# Patient Record
Sex: MALE | Race: WHITE | NOT HISPANIC OR LATINO | URBAN - METROPOLITAN AREA
[De-identification: names, ages, dates, MRNs, and addresses within clinical notes are randomized per-mention and may not be internally consistent; named-entity substitution may affect disease eponyms.]

---

## 2017-02-04 ENCOUNTER — OFFICE VISIT (OUTPATIENT)
Dept: RETAIL CLINIC | Facility: CLINIC | Age: 22
End: 2017-02-04

## 2017-02-04 VITALS
DIASTOLIC BLOOD PRESSURE: 80 MMHG | SYSTOLIC BLOOD PRESSURE: 120 MMHG | RESPIRATION RATE: 18 BRPM | OXYGEN SATURATION: 98 % | HEART RATE: 108 BPM | TEMPERATURE: 98 F

## 2017-02-04 DIAGNOSIS — H65.191 OTHER ACUTE NONSUPPURATIVE OTITIS MEDIA OF RIGHT EAR, RECURRENCE NOT SPECIFIED: Primary | ICD-10-CM

## 2017-02-04 PROCEDURE — 99203 OFFICE O/P NEW LOW 30 MIN: CPT | Performed by: NURSE PRACTITIONER

## 2017-02-04 RX ORDER — AZITHROMYCIN 250 MG/1
TABLET, FILM COATED ORAL
Qty: 6 TABLET | Refills: 0 | Status: SHIPPED | OUTPATIENT
Start: 2017-02-04 | End: 2021-12-06

## 2017-02-04 NOTE — PROGRESS NOTES
Subjective   Sarkis Wick is a 21 y.o. male.     HPI Comments: Patient reports PCN allergy and also doesn't think he is able to use cephalosporins either.    Earache    There is pain in the right ear. This is a new problem. Episode onset: 2 days ago. The problem occurs constantly. The problem has been rapidly worsening. The maximum temperature recorded prior to his arrival was 103 - 104 F (tmax 103.4 2 days ago, afebrile last 36 hours). The fever has been present for less than 1 day. Associated symptoms include hearing loss (right only, decreased) and a sore throat. Pertinent negatives include no abdominal pain, coughing, diarrhea, ear discharge, headaches, neck pain, rash, rhinorrhea or vomiting. Treatments tried: mucinex. The treatment provided no relief. There is no history of a chronic ear infection, hearing loss or a tympanostomy tube.       The following portions of the patient's history were reviewed and updated as appropriate: allergies, current medications, past family history, past medical history, past social history, past surgical history and problem list.    Review of Systems   Constitutional: Negative for appetite change, chills, diaphoresis, fatigue and fever.   HENT: Positive for ear pain (right only), hearing loss (right only, decreased), sinus pressure and sore throat. Negative for congestion, dental problem, ear discharge, facial swelling, mouth sores, nosebleeds, postnasal drip, rhinorrhea, sneezing, tinnitus, trouble swallowing and voice change.    Eyes: Negative for pain, discharge, redness and itching.   Respiratory: Negative for cough, chest tightness, shortness of breath, wheezing and stridor.    Cardiovascular: Negative for chest pain and palpitations.   Gastrointestinal: Negative for abdominal pain, constipation, diarrhea, nausea and vomiting.   Genitourinary: Negative for decreased urine volume.   Musculoskeletal: Negative for myalgias, neck pain and neck stiffness.   Skin: Negative for  rash.   Allergic/Immunologic: Negative for environmental allergies.   Neurological: Negative for dizziness, syncope, weakness and headaches.       Objective   Physical Exam   Constitutional: He is oriented to person, place, and time. He appears well-developed and well-nourished. He is cooperative.  Non-toxic appearance. He does not appear ill. No distress.   HENT:   Right Ear: External ear normal. There is swelling (canal) and tenderness. No drainage. No mastoid tenderness. Tympanic membrane is erythematous (severe) and retracted. Tympanic membrane is not scarred, not perforated and not bulging. Decreased hearing is noted.   Left Ear: Hearing, external ear and ear canal normal. Tympanic membrane is not scarred, not perforated, not erythematous, not retracted and not bulging. A middle ear effusion is present. No decreased hearing is noted.   Nose: Nose normal. Right sinus exhibits no maxillary sinus tenderness and no frontal sinus tenderness. Left sinus exhibits no maxillary sinus tenderness and no frontal sinus tenderness.   Mouth/Throat: Uvula is midline and mucous membranes are normal. Posterior oropharyngeal erythema present. No oropharyngeal exudate or posterior oropharyngeal edema. Tonsils are 2+ on the right. Tonsils are 2+ on the left. No tonsillar exudate.   Eyes: Conjunctivae and lids are normal.   Cardiovascular: Normal rate, regular rhythm, S1 normal and S2 normal.    Pulmonary/Chest: Effort normal and breath sounds normal.   Abdominal: Soft. Normal appearance and bowel sounds are normal. There is no tenderness.   Lymphadenopathy:     He has no cervical adenopathy.   Neurological: He is alert and oriented to person, place, and time.   Skin: Skin is warm and dry. He is not diaphoretic.   Vitals reviewed.      Assessment/Plan   Sarkis was seen today for earache.    Diagnoses and all orders for this visit:    Other acute nonsuppurative otitis media of right ear, recurrence not specified  -     azithromycin  (ZITHROMAX Z-KRISTA) 250 MG tablet; Take 2 tablets the first day, then 1 tablet daily for 4 days.     Establish care with PCP.  Follow up with PCP for persistent or worsening symptoms.

## 2017-02-04 NOTE — PATIENT INSTRUCTIONS

## 2021-12-06 ENCOUNTER — HOSPITAL ENCOUNTER (EMERGENCY)
Facility: HOSPITAL | Age: 26
Discharge: HOME | End: 2021-12-06
Attending: EMERGENCY MEDICINE | Admitting: EMERGENCY MEDICINE

## 2021-12-06 VITALS
BODY MASS INDEX: 28.12 KG/M2 | HEIGHT: 66 IN | RESPIRATION RATE: 18 BRPM | SYSTOLIC BLOOD PRESSURE: 144 MMHG | HEART RATE: 118 BPM | TEMPERATURE: 98.3 F | WEIGHT: 175 LBS | DIASTOLIC BLOOD PRESSURE: 84 MMHG | OXYGEN SATURATION: 98 %

## 2021-12-06 DIAGNOSIS — S61.210A LACERATION OF RIGHT INDEX FINGER WITHOUT FOREIGN BODY WITHOUT DAMAGE TO NAIL, INITIAL ENCOUNTER: Primary | ICD-10-CM

## 2021-12-06 PROCEDURE — 99282 EMERGENCY DEPT VISIT SF MDM: CPT

## 2021-12-06 PROCEDURE — 12002 RPR S/N/AX/GEN/TRNK2.6-7.5CM: CPT | Performed by: EMERGENCY MEDICINE

## 2021-12-06 RX ORDER — BUPIVACAINE HYDROCHLORIDE 5 MG/ML
10 INJECTION, SOLUTION EPIDURAL; INTRACAUDAL ONCE
Status: DISCONTINUED | OUTPATIENT
Start: 2021-12-06 | End: 2021-12-07 | Stop reason: HOSPADM

## 2021-12-07 NOTE — ED PROVIDER NOTES
Subjective   History of Present Illness  History of Present Illness    Chief complaint: Laceration    Location: Right index finger    Quality/Severity: Moderate pain bleeding    Timing/Duration: Just occurred recently this evening    Modifying Factors: None    Narrative: This patient presents for evaluation of an accidental laceration to his right index finger.  He was working on his tractor when he cut the finger on a piece of sheet metal.  It caused some immediate pain and bleeding.  He denies any other areas of injury or concern.    Associated Symptoms: None    Review of Systems   Constitutional: Negative for activity change and fever.   HENT: Negative.    Respiratory: Negative for cough and shortness of breath.    Cardiovascular: Negative for chest pain.   Gastrointestinal: Negative for abdominal pain.   Skin: Positive for wound. Negative for color change.   Neurological: Negative for syncope, weakness and numbness.   All other systems reviewed and are negative.      Past Medical History:   Diagnosis Date   • Anxiety        Allergies   Allergen Reactions   • Amoxicillin Swelling   • Pineapple Swelling       History reviewed. No pertinent surgical history.    Family History   Problem Relation Age of Onset   • No Known Problems Mother    • No Known Problems Father        Social History     Socioeconomic History   • Marital status: Single   Tobacco Use   • Smoking status: Former Smoker   Substance and Sexual Activity   • Alcohol use: Yes     Comment: beer once weekly   • Drug use: No       ED Triage Vitals [12/06/21 2131]   Temp Heart Rate Resp BP SpO2   98.3 °F (36.8 °C) 118 18 144/84 98 %      Temp src Heart Rate Source Patient Position BP Location FiO2 (%)   Oral Monitor Lying Right arm --         Objective   Physical Exam  Vitals and nursing note reviewed.   Constitutional:       General: He is not in acute distress.     Appearance: He is well-developed. He is not toxic-appearing or diaphoretic.   HENT:       Head: Normocephalic and atraumatic.   Eyes:      General:         Right eye: No discharge.         Left eye: No discharge.      Pupils: Pupils are equal, round, and reactive to light.   Cardiovascular:      Rate and Rhythm: Normal rate and regular rhythm.   Pulmonary:      Effort: Pulmonary effort is normal. No respiratory distress.   Musculoskeletal:         General: Tenderness and signs of injury present. No deformity. Normal range of motion.      Cervical back: Normal range of motion and neck supple.      Comments: There is a single, crescentic shaped flap laceration injury to the right index finger between the PIP and DIP at the medial /dorsal aspect.  The laceration is about 3 cm long.  It extends into the subcutaneous layer and there is moderate oozing of dark red blood present.  There are no foreign bodies evident within the wound.  Inspection of the wound in a bloodless field does reveal that the wound extends down to the level of the extensor tendon but I do not see any laceration or injury to the extensor tendon at all.  Patient has completely normal range of motion for flexion and extension of the finger at all joints appropriately.  He has normal strength for flexion and extension of the finger as well.  He has normal distal sensation at the tip of the finger.  The remainder of the hand and wrist are uninjured.   Skin:     General: Skin is warm and dry.      Findings: No erythema or rash.   Neurological:      General: No focal deficit present.      Mental Status: He is alert and oriented to person, place, and time.      Sensory: No sensory deficit.      Motor: No weakness.   Psychiatric:         Behavior: Behavior normal.         Thought Content: Thought content normal.         Judgment: Judgment normal.         Laceration Repair    Date/Time: 12/6/2021 11:20 PM  Performed by: Neymar Garcia MD  Authorized by: Neymar Garcia MD     Consent:     Consent obtained:  Verbal    Consent given by:   Patient    Risks discussed:  Pain, retained foreign body, tendon damage, vascular damage, poor wound healing, need for additional repair and infection  Anesthesia (see MAR for exact dosages):     Anesthesia method:  Nerve block    Block location:  Right index finger    Block needle gauge:  30 G    Block anesthetic:  Bupivacaine 0.5% w/o epi    Block technique:  Digital block    Block injection procedure:  Anatomic landmarks identified, introduced needle and incremental injection    Block outcome:  Anesthesia achieved  Laceration details:     Location:  Finger    Finger location:  L index finger    Length (cm):  3  Repair type:     Repair type:  Simple  Pre-procedure details:     Preparation:  Patient was prepped and draped in usual sterile fashion  Exploration:     Hemostasis achieved with:  Direct pressure    Wound exploration: wound explored through full range of motion and entire depth of wound probed and visualized      Wound extent: no foreign bodies/material noted, no nerve damage noted, no tendon damage noted, no underlying fracture noted and no vascular damage noted      Contaminated: no    Treatment:     Area cleansed with:  Saline and Hibiclens    Amount of cleaning:  Extensive    Irrigation solution:  Sterile saline    Irrigation method:  Pressure wash  Skin repair:     Repair method:  Sutures    Suture size:  4-0    Suture material:  Nylon    Suture technique:  Simple interrupted    Number of sutures:  8  Approximation:     Approximation:  Close  Post-procedure details:     Dressing:  Antibiotic ointment, bulky dressing and non-adherent dressing    Patient tolerance of procedure:  Tolerated well, no immediate complications               ED Course  ED Course as of 12/06/21 2322   Mon Dec 06, 2021   2322 Patient presented with an accidental laceration to the right index finger.  I anesthetized the finger with a digital block.  Then I personally irrigated the wound copiously with saline and wound cleanser.   I evaluated the wound carefully.  There was no evidence of tendon laceration at all.  Patient had completely normal range of motion at all joints for flexion and extension.  Distal neurovascular exam was normal prior to anesthesia as well.  So I repaired the wound with a total sutures.  Advised him on wound care and dressing changes this week.  Advised sutures should be removed in 10 days.  Discharged home in good condition after that. [BURAK]      ED Course User Index  [BURAK] Neymar Garcia MD                                                 [facility]    Final diagnoses:   Laceration of right index finger without foreign body without damage to nail, initial encounter       ED Disposition  ED Disposition     ED Disposition Condition Comment    Discharge Stable           Urgent care or Primary Care    Schedule an appointment as soon as possible for a visit in 1 week  For suture removal, For wound re-check         Medication List      No changes were made to your prescriptions during this visit.          Neymar Garcia MD  12/06/21 1728

## 2021-12-07 NOTE — DISCHARGE INSTRUCTIONS
Sutures should be removed in about 10 days.  Please return to the ER for any worsening pain, swelling, bleeding, drainage or any other concerns.

## 2024-05-18 ENCOUNTER — HOSPITAL ENCOUNTER (EMERGENCY)
Facility: HOSPITAL | Age: 29
Discharge: HOME | End: 2024-05-18
Attending: STUDENT IN AN ORGANIZED HEALTH CARE EDUCATION/TRAINING PROGRAM
Payer: COMMERCIAL

## 2024-05-18 VITALS
WEIGHT: 195 LBS | SYSTOLIC BLOOD PRESSURE: 140 MMHG | HEART RATE: 98 BPM | HEIGHT: 67 IN | OXYGEN SATURATION: 96 % | RESPIRATION RATE: 18 BRPM | DIASTOLIC BLOOD PRESSURE: 89 MMHG | BODY MASS INDEX: 30.61 KG/M2 | TEMPERATURE: 97.7 F

## 2024-05-18 DIAGNOSIS — S61.411A LACERATION OF RIGHT HAND WITHOUT FOREIGN BODY, INITIAL ENCOUNTER: Primary | ICD-10-CM

## 2024-05-18 PROCEDURE — 25010000002 TETANUS-DIPHTH-ACELL PERTUSSIS 5-2.5-18.5 LF-MCG/0.5 SUSPENSION PREFILLED SYRINGE

## 2024-05-18 PROCEDURE — 99283 EMERGENCY DEPT VISIT LOW MDM: CPT

## 2024-05-18 PROCEDURE — 90471 IMMUNIZATION ADMIN: CPT

## 2024-05-18 PROCEDURE — 90715 TDAP VACCINE 7 YRS/> IM: CPT

## 2024-05-18 RX ORDER — LIDOCAINE HYDROCHLORIDE 20 MG/ML
10 INJECTION, SOLUTION INFILTRATION; PERINEURAL ONCE
Status: COMPLETED | OUTPATIENT
Start: 2024-05-18 | End: 2024-05-18

## 2024-05-18 RX ORDER — CEPHALEXIN 500 MG/1
500 CAPSULE ORAL 2 TIMES DAILY
Qty: 14 CAPSULE | Refills: 0 | Status: SHIPPED | OUTPATIENT
Start: 2024-05-18 | End: 2024-05-25

## 2024-05-18 RX ORDER — CEPHALEXIN 500 MG/1
500 CAPSULE ORAL ONCE
Status: COMPLETED | OUTPATIENT
Start: 2024-05-18 | End: 2024-05-18

## 2024-05-18 RX ADMIN — CEPHALEXIN 500 MG: 500 CAPSULE ORAL at 01:35

## 2024-05-18 RX ADMIN — TETANUS TOXOID, REDUCED DIPHTHERIA TOXOID AND ACELLULAR PERTUSSIS VACCINE, ADSORBED 0.5 ML: 5; 2.5; 8; 8; 2.5 SUSPENSION INTRAMUSCULAR at 01:24

## 2024-05-18 RX ADMIN — LIDOCAINE HYDROCHLORIDE 10 ML: 20 INJECTION, SOLUTION INFILTRATION; PERINEURAL at 01:28

## 2024-05-18 NOTE — DISCHARGE INSTRUCTIONS

## 2024-05-18 NOTE — Clinical Note
[facility]  [facility] EMERGENCY DEPARTMENT  [address] [place] [address]  Phone: [phone]    Sarkis Wick was seen and treated in our emergency department on 5/18/2024.  He may return to work on 05/20/2024.         Thank you for choosing [facility].    Jelani Limon, DO

## 2024-05-18 NOTE — ED PROVIDER NOTES
Subjective   History of Present Illness  This is a very pleasant 28-year-old male who presents to the emergency department with a laceration to the right hand that occurred just prior to arrival he was at work.  The patient cut his hand on a staple while opening a box.  The patient denies any numbness, tingling or loss of sensation, blood dyscrasias or decrease in range of motion of the hand or fingers.      Review of Systems   Constitutional:  Negative for activity change, chills, diaphoresis and fever.   HENT: Negative.     Respiratory: Negative.     Cardiovascular: Negative.    Skin: Negative.    Neurological: Negative.    All other systems reviewed and are negative.      Past Medical History:   Diagnosis Date    Anxiety        Allergies   Allergen Reactions    Amoxicillin Swelling    Pineapple Swelling       History reviewed. No pertinent surgical history.    Family History   Problem Relation Age of Onset    No Known Problems Mother     No Known Problems Father        Social History     Socioeconomic History    Marital status: Single   Tobacco Use    Smoking status: Former   Substance and Sexual Activity    Alcohol use: Yes     Comment: beer once weekly    Drug use: No           Objective   Physical Exam  Vitals and nursing note reviewed.   Constitutional:       Appearance: Normal appearance. He is normal weight. He is not ill-appearing, toxic-appearing or diaphoretic.   HENT:      Head: Normocephalic and atraumatic.      Right Ear: External ear normal.      Left Ear: External ear normal.      Nose: Nose normal. No congestion or rhinorrhea.      Mouth/Throat:      Pharynx: No oropharyngeal exudate or posterior oropharyngeal erythema.   Eyes:      Extraocular Movements: Extraocular movements intact.      Conjunctiva/sclera: Conjunctivae normal.      Pupils: Pupils are equal, round, and reactive to light.   Cardiovascular:      Rate and Rhythm: Normal rate and regular rhythm.      Pulses: Normal pulses.    Pulmonary:      Effort: Pulmonary effort is normal.      Breath sounds: Normal breath sounds. No stridor. No wheezing, rhonchi or rales.   Chest:      Chest wall: No tenderness.   Abdominal:      General: There is no distension.      Palpations: Abdomen is soft. There is no mass.   Musculoskeletal:         General: No swelling, tenderness or signs of injury. Normal range of motion.      Cervical back: Normal range of motion. No rigidity or tenderness.   Skin:     General: Skin is warm.      Capillary Refill: Capillary refill takes less than 2 seconds.      Coloration: Skin is not jaundiced or pale.      Findings: No bruising.      Comments: There is a 6 cm linear laceration over the dorsal aspect of the interdigit eminence between the thumb and index finger on the right hand.  Minimal bleeding, adipose noted   Neurological:      General: No focal deficit present.      Mental Status: He is alert and oriented to person, place, and time. Mental status is at baseline.      Motor: No weakness.         Procedures           ED Course                                             Medical Decision Making  Laceration Procedure Note    Time: 01:35  Confirmed correct: Patient, procedure, side, site  Consent: Patient, verbal     Description of repair: Length 6 cm  Location: right hand  Shape: linear  Depth: subcutaneous  Details: clean  Neurovascular / tendon exam: intact  Anesthesia: 6 ml, 2% lidocaine  Irrigation: Copious saline  Debridement: moderate to extensive to thoroughly cleanse and debride the area  Skin closure: # 8 sutures, simple interrupted, 5-0 Vicryl    Complexity: simple     Post procedure exam: Circulation, motor, sensory intact  Complications: None  Patient tolerated: Well  Performed by: Jelani Limon DO  Total time: 10 minutes  ================================    MDM:    Escalation of care including admission/observation considered    - Discussions of management with other providers:  None    -  Discussed/reviewed with Radiology regarding test interpretation    - Independent interpretation: None    - Additional patient history obtained from: [facility]    - Review of external non-ED record (if available):  Prior Inpt record, Office record, Outpt record, Prior Outpt labs, PCP record, Outside ED record, Other    - Chronic conditions affecting care: See HPI and medical Hx.    - Social Determinants of health significantly affecting care:  None        Medical Decision Making Discussion:    Healthy 28-year-old presents with a laceration to the right hand that occurred just prior to arrival.  He does work extensively with his hands, the wound was copiously irrigated with Betadine, he is neurovascularly intact, otherwise well-appearing, no other injury.  His sensation is grossly intact.  The patient is well-appearing, sutures were placed without difficulty.  Patient tolerated well.  He was given a tetanus shot here in the ED.  He will follow-up with PCP and he is given prophylactic Keflex.  First dose given here in the ED.  Stable for discharge.    The patient has been given very strict return precautions to return to the emergency department should there be any acute change or worsening of their condition.  I have explained my findings and the patient has expressed understanding to me.  I explained that the work-up performed in the ED has been based on the specific complaint and concern, as the nature of emergency medicine is complaint driven and they understand that new symptoms may arise.  I have told them that, should there be any new symptoms, worsening or changing symptoms, a new work-up may be indicated that they are encouraged to return to the emergency department or promptly contact their primary care physician. We have employed a shared decision-making process as the discussion of their disposition.  The patient has been educated as to the nature of the visit, the tests and work-up performed and the findings  from today's visit. At this time, there does not appear to be any acute emergent process that necessitates admission to the hospital, however, the patient understands that this can change unexpectedly. At this time, the patient is stable for discharge home and agrees to follow-up with her primary care physician in the next 24 to 48 hours or earlier should they be able to obtain an appointment.    The patient was counseled regarding diagnostic results and treatment plan and patient has indicated understanding of these instructions.        Risk  Prescription drug management.        Final diagnoses:   Laceration of right hand without foreign body, initial encounter       ED Disposition  ED Disposition       ED Disposition   Discharge    Condition   Good    Comment   --               Provider, No Known  [facility] [address] [phone]               Medication List        New Prescriptions      cephalexin 500 MG capsule  Commonly known as: KEFLEX  Take 1 capsule by mouth 2 (Two) Times a Day for 7 days.               Where to Get Your Medications        These medications were sent to [id number] [place] [address] AT [facility] [address] - [phone]  - [phone] [address] [facility] [address]      Phone: [phone]   cephalexin 500 MG capsule            Jelani Limon DO  05/18/24 0147

## 2024-05-18 NOTE — ED NOTES
Bacitracin was placed on the sutured wound bed then a non-adherent dressing was placed and wrapped with coban. The patient was sent home with dressing supplies.

## 2024-05-18 NOTE — Clinical Note
[facility]  [facility] EMERGENCY DEPARTMENT  [address] [place] [address]  Phone: [phone]    Sarkis Wick was seen and treated in our emergency department on 5/18/2024.  He may return to work on 05/20/2024.         Thank you for choosing [facility].    Jelani Limon, DO